# Patient Record
(demographics unavailable — no encounter records)

---

## 2025-05-08 NOTE — DATA REVIEWED
[de-identified] : Brain MRI was performed on 9/17/19 at [facility].\par  The study was unremarkable.

## 2025-05-08 NOTE — CONSULT LETTER
[Dear  ___] : Dear  [unfilled], [Courtesy Letter:] : I had the pleasure of seeing your patient, [unfilled], in my office today. [Please see my note below.] : Please see my note below. [Consult Closing:] : Thank you very much for allowing me to participate in the care of this patient.  If you have any questions, please do not hesitate to contact me. [Sincerely,] : Sincerely, [FreeTextEntry3] : Kelechi Purdy MD.

## 2025-05-08 NOTE — HISTORY OF PRESENT ILLNESS
[FreeTextEntry1] : I saw this patient in the office today.  As you recall she has a long history of chronic headache. This has been with her for many years. They had been very severe. They were also quite frequent occurring almost daily.  She had been on nortriptyline with some initial response. The headaches then recurred and were daily. I had switched her to topiramate. She reported marked improvement in the headaches.  5/8/2025 visit: She was last here in 2022. She ultimately ran out of topiramate I did not call for renewals. The headaches ultimately returned and are now with her a few times per week.

## 2025-05-08 NOTE — ASSESSMENT
[FreeTextEntry1] : This is a 54-year-old woman with a long history of migraines. Brain imaging was unremarkable.  She has responded well to topiramate. I will restart the dosage of 25 mg at bedtime. I will also renew sumatriptan tablets to be used as needed.  I will see her back in 1 year.